# Patient Record
Sex: MALE | Race: WHITE | URBAN - METROPOLITAN AREA
[De-identification: names, ages, dates, MRNs, and addresses within clinical notes are randomized per-mention and may not be internally consistent; named-entity substitution may affect disease eponyms.]

---

## 2022-03-24 ENCOUNTER — OFFICE VISIT (OUTPATIENT)
Dept: URBAN - METROPOLITAN AREA CLINIC 6 | Facility: CLINIC | Age: 47
End: 2022-03-24
Payer: COMMERCIAL

## 2022-03-24 DIAGNOSIS — H40.013 OPEN ANGLE WITH BORDERLINE FINDINGS, LOW RISK, BILATERAL: Primary | ICD-10-CM

## 2022-03-24 DIAGNOSIS — H18.603 BILATERAL KERATOCONUS: ICD-10-CM

## 2022-03-24 DIAGNOSIS — H25.13 AGE-RELATED NUCLEAR CATARACT, BILATERAL: ICD-10-CM

## 2022-03-24 PROCEDURE — 92004 COMPRE OPH EXAM NEW PT 1/>: CPT | Performed by: OPTOMETRIST

## 2022-03-24 ASSESSMENT — INTRAOCULAR PRESSURE
OD: 10
OS: 13

## 2022-03-24 ASSESSMENT — KERATOMETRY
OD: 44.13
OS: 41.13

## 2022-03-24 NOTE — IMPRESSION/PLAN
Impression: Open angle with borderline findings, low risk, bilateral: H40.013. Plan: Pt was told he has Glc at the [address] so came for second opinion.  Continue to monitor with Dr. Bucky Cagle

## 2022-03-24 NOTE — IMPRESSION/PLAN
Impression: Age-related nuclear cataract, bilateral: H25.13. Plan: Observe for now without intervention. The patient was advised to contact us if any change or worsening of vision. Initiate Treatment: mometasone 0.1 % topical cream \\nSig: Apply to affected on the face twice daily Monday thru Thursday\\n\\nElidel 1 % topical cream \\nSig: Apply to affected areas twice daily Friday through Sunday Render In Strict Bullet Format?: No Detail Level: Zone

## 2022-03-24 NOTE — IMPRESSION/PLAN
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 Plan: [facility] OU (Dx ~ 17 years ago). Pt does wear CLs and BCVA 20/25 OD/OS. Pt does see Dr Carito Olivas and next appt on 3/29/22.